# Patient Record
Sex: MALE | Race: WHITE | NOT HISPANIC OR LATINO | Employment: OTHER | URBAN - METROPOLITAN AREA
[De-identification: names, ages, dates, MRNs, and addresses within clinical notes are randomized per-mention and may not be internally consistent; named-entity substitution may affect disease eponyms.]

---

## 2017-05-09 DIAGNOSIS — K21.9 GASTROESOPHAGEAL REFLUX DISEASE, ESOPHAGITIS PRESENCE NOT SPECIFIED: ICD-10-CM

## 2017-05-09 DIAGNOSIS — E78.5 HYPERLIPIDEMIA, UNSPECIFIED HYPERLIPIDEMIA TYPE: ICD-10-CM

## 2017-05-10 RX ORDER — EZETIMIBE 10 MG/1
TABLET ORAL
Qty: 30 TABLET | Refills: 0 | Status: SHIPPED | OUTPATIENT
Start: 2017-05-10 | End: 2017-07-15 | Stop reason: SDUPTHER

## 2017-06-12 DIAGNOSIS — K21.9 GASTROESOPHAGEAL REFLUX DISEASE, ESOPHAGITIS PRESENCE NOT SPECIFIED: ICD-10-CM

## 2017-06-12 DIAGNOSIS — E78.5 HYPERLIPIDEMIA, UNSPECIFIED HYPERLIPIDEMIA TYPE: ICD-10-CM

## 2017-06-12 RX ORDER — EZETIMIBE 10 MG/1
TABLET ORAL
Qty: 30 TABLET | Refills: 0 | OUTPATIENT
Start: 2017-06-12

## 2017-06-14 ENCOUNTER — OFFICE VISIT (OUTPATIENT)
Dept: INTERNAL MEDICINE | Facility: CLINIC | Age: 42
End: 2017-06-14
Payer: COMMERCIAL

## 2017-06-14 VITALS
BODY MASS INDEX: 23.51 KG/M2 | HEART RATE: 90 BPM | HEIGHT: 70 IN | DIASTOLIC BLOOD PRESSURE: 68 MMHG | TEMPERATURE: 97 F | OXYGEN SATURATION: 99 % | SYSTOLIC BLOOD PRESSURE: 126 MMHG | WEIGHT: 164.25 LBS

## 2017-06-14 DIAGNOSIS — G47.00 INSOMNIA, UNSPECIFIED TYPE: Primary | ICD-10-CM

## 2017-06-14 DIAGNOSIS — M79.2 NEURALGIA: ICD-10-CM

## 2017-06-14 DIAGNOSIS — E78.2 MIXED HYPERLIPIDEMIA: ICD-10-CM

## 2017-06-14 PROCEDURE — 99213 OFFICE O/P EST LOW 20 MIN: CPT | Mod: S$GLB,,, | Performed by: PHYSICIAN ASSISTANT

## 2017-06-14 PROCEDURE — 99999 PR PBB SHADOW E&M-EST. PATIENT-LVL III: CPT | Mod: PBBFAC,,, | Performed by: PHYSICIAN ASSISTANT

## 2017-06-14 NOTE — PROGRESS NOTES
Subjective:       Patient ID: Jon Mora is a 41 y.o.W/ male.    Chief Complaint: Insomnia    HPI         He comes in today by himself and has the above need.  He wants to try and get something to take for insomnia.  He says he's had this problem all of his life.  He's been on a number of Leonardo's which he refuses to take again because they don't work.  Those include Ambien, Lunesta, Elavil, Restoril.  He's also requesting that his pain medicines be reinstated such as Norco and he also wants to have his anxiety medicine Xanax reinstated.  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  He says that he got these other medicines he's requesting from a physician down in [facility].  He's only seen his PCP Dr. Kevin Seo one time.        He says he's getting some life insurance and they're going to need to have a test of his glucose and also his lipid profile.  He wonders if he can get that test done today.  He says he hasn't eaten anything since around 7 AM.        I explained to him that he doesn't have many options on sleeping medicine since he is ruled out most of them.  I also do not feel comfortable refilling medicines but I don't think he needs since I've never seen this person before.    Review of Systems    Otherwise negative concerning the NEUROPSYCHIATRIC, MUSCULOSKELETAL, ORTHOPEDIC, CV, VASCULAR system review.    Objective:      Physical Exam    There was no physical exam today.    Assessment:       1. Insomnia, unspecified type    2. Neuralgia    3. Mixed hyperlipidemia        Plan:     1.  We will try to get him in to see Dr. Seo as soon as we can so that he may request refills for these controlled substances if Dr. Seo feels as necessary.  Otherwise he can go back to [place] and have his medicines refilled by the person the put him on them in the first place.  2.   Ordered FBS and FLP's today.

## 2017-06-19 ENCOUNTER — TELEPHONE (OUTPATIENT)
Dept: INTERNAL MEDICINE | Facility: CLINIC | Age: 42
End: 2017-06-19

## 2017-06-19 NOTE — TELEPHONE ENCOUNTER
----- Message from Tamar Stiles sent at 6/14/2017  4:41 PM CDT -----  Contact: pt [phone]  Pt called requesting apt with Dr Salas.  Last seen 2013. But pt states he touched base with him via e-mail. Pt states he is willing to drive from his home in [place] to see him because he is the one that diagnosed him with Neuralgia and he is having some trouble with this.  Pt can be reached at [phone]  Thanks  nisha

## 2017-06-19 NOTE — TELEPHONE ENCOUNTER
----- Message from Jon Salas MD sent at 6/19/2017  9:15 AM CDT -----  Contact: pt [phone]      ----- Message -----  From: Tamar Stiles  Sent: 6/14/2017   4:41 PM  To: [facility] Staff    Pt called requesting apt with Dr Salas.  Last seen 2013. But pt states he touched base with him via e-mail. Pt states he is willing to drive from his home in [place] to see him because he is the one that diagnosed him with Neuralgia and he is having some trouble with this.  Pt can be reached at [phone]  Thanks  nisha

## 2017-06-20 ENCOUNTER — OFFICE VISIT (OUTPATIENT)
Dept: INTERNAL MEDICINE | Facility: CLINIC | Age: 42
End: 2017-06-20
Attending: FAMILY MEDICINE
Payer: COMMERCIAL

## 2017-06-20 ENCOUNTER — LAB VISIT (OUTPATIENT)
Dept: LAB | Facility: HOSPITAL | Age: 42
End: 2017-06-20
Attending: FAMILY MEDICINE
Payer: COMMERCIAL

## 2017-06-20 VITALS
HEART RATE: 75 BPM | BODY MASS INDEX: 23.58 KG/M2 | WEIGHT: 164.69 LBS | OXYGEN SATURATION: 99 % | SYSTOLIC BLOOD PRESSURE: 110 MMHG | DIASTOLIC BLOOD PRESSURE: 76 MMHG | HEIGHT: 70 IN

## 2017-06-20 DIAGNOSIS — R10.9 ABDOMINAL PAIN, OTHER SPECIFIED SITE: ICD-10-CM

## 2017-06-20 DIAGNOSIS — G47.00 INSOMNIA, UNSPECIFIED TYPE: ICD-10-CM

## 2017-06-20 DIAGNOSIS — M79.2 NEURALGIA: Primary | ICD-10-CM

## 2017-06-20 DIAGNOSIS — Z00.00 ANNUAL PHYSICAL EXAM: ICD-10-CM

## 2017-06-20 DIAGNOSIS — K58.9 IRRITABLE BOWEL SYNDROME WITHOUT DIARRHEA: ICD-10-CM

## 2017-06-20 LAB
ALBUMIN SERPL BCP-MCNC: 4.4 G/DL
ALP SERPL-CCNC: 59 U/L
ALT SERPL W/O P-5'-P-CCNC: 19 U/L
ANION GAP SERPL CALC-SCNC: 7 MMOL/L
AST SERPL-CCNC: 21 U/L
BILIRUB SERPL-MCNC: 0.4 MG/DL
BUN SERPL-MCNC: 15 MG/DL
CALCIUM SERPL-MCNC: 9.6 MG/DL
CHLORIDE SERPL-SCNC: 105 MMOL/L
CHOLEST/HDLC SERPL: 6.6 {RATIO}
CO2 SERPL-SCNC: 29 MMOL/L
CREAT SERPL-MCNC: 0.9 MG/DL
ERYTHROCYTE [DISTWIDTH] IN BLOOD BY AUTOMATED COUNT: 13.1 %
EST. GFR  (AFRICAN AMERICAN): >60 ML/MIN/1.73 M^2
EST. GFR  (NON AFRICAN AMERICAN): >60 ML/MIN/1.73 M^2
GLUCOSE SERPL-MCNC: 105 MG/DL
HCT VFR BLD AUTO: 42 %
HDL/CHOLESTEROL RATIO: 15.1 %
HDLC SERPL-MCNC: 225 MG/DL
HDLC SERPL-MCNC: 34 MG/DL
HGB BLD-MCNC: 14.2 G/DL
LDLC SERPL CALC-MCNC: 160.2 MG/DL
MCH RBC QN AUTO: 29.8 PG
MCHC RBC AUTO-ENTMCNC: 33.8 %
MCV RBC AUTO: 88 FL
NONHDLC SERPL-MCNC: 191 MG/DL
PLATELET # BLD AUTO: 226 K/UL
PMV BLD AUTO: 11.7 FL
POTASSIUM SERPL-SCNC: 4 MMOL/L
PROT SERPL-MCNC: 7.8 G/DL
RBC # BLD AUTO: 4.76 M/UL
SODIUM SERPL-SCNC: 141 MMOL/L
TRIGL SERPL-MCNC: 154 MG/DL
WBC # BLD AUTO: 6.62 K/UL

## 2017-06-20 PROCEDURE — 99214 OFFICE O/P EST MOD 30 MIN: CPT | Mod: S$GLB,,, | Performed by: FAMILY MEDICINE

## 2017-06-20 PROCEDURE — 85027 COMPLETE CBC AUTOMATED: CPT

## 2017-06-20 PROCEDURE — 80061 LIPID PANEL: CPT

## 2017-06-20 PROCEDURE — 80053 COMPREHEN METABOLIC PANEL: CPT

## 2017-06-20 PROCEDURE — 99999 PR PBB SHADOW E&M-EST. PATIENT-LVL IV: CPT | Mod: PBBFAC,,, | Performed by: FAMILY MEDICINE

## 2017-06-20 PROCEDURE — 36415 COLL VENOUS BLD VENIPUNCTURE: CPT

## 2017-06-20 RX ORDER — TRAMADOL HYDROCHLORIDE 50 MG/1
50-100 TABLET ORAL
Qty: 40 TABLET | Refills: 0 | Status: SHIPPED | OUTPATIENT
Start: 2017-06-20

## 2017-06-20 RX ORDER — GABAPENTIN 100 MG/1
100 CAPSULE ORAL NIGHTLY
Qty: 30 CAPSULE | Refills: 11 | Status: SHIPPED | OUTPATIENT
Start: 2017-06-20 | End: 2017-08-10

## 2017-06-20 NOTE — PROGRESS NOTES
Subjective:       Patient ID: Jon Mora is a 41 y.o. male.    Chief Complaint: Follow-up    HPI  Review of Systems   Constitutional: Negative for chills, fatigue and fever.   HENT: Negative for congestion and trouble swallowing.    Eyes: Negative for redness.   Respiratory: Negative for cough, chest tightness and shortness of breath.    Cardiovascular: Negative for chest pain, palpitations and leg swelling.   Gastrointestinal: Positive for abdominal pain. Negative for blood in stool.   Genitourinary: Negative for hematuria.   Musculoskeletal: Positive for arthralgias and gait problem. Negative for back pain, joint swelling, myalgias and neck pain.   Skin: Negative for color change and rash.   Neurological: Negative for tremors, speech difficulty, weakness, numbness and headaches.   Hematological: Negative for adenopathy. Does not bruise/bleed easily.   Psychiatric/Behavioral: Positive for sleep disturbance. Negative for behavioral problems and confusion. The patient is not nervous/anxious.        Objective:      Physical Exam   Constitutional: He is oriented to person, place, and time. He appears well-developed and well-nourished. No distress.   Neck: Neck supple.   Pulmonary/Chest: Effort normal.   Musculoskeletal: He exhibits no edema.        Right lower leg: He exhibits no edema.        Left lower leg: He exhibits no edema.   Neurological: He is alert and oriented to person, place, and time.   Skin: Skin is warm and dry. No rash noted.   Psychiatric: He has a normal mood and affect. His behavior is normal. Judgment and thought content normal.   Nursing note and vitals reviewed.      Assessment:       1. Neuralgia    2. Abdominal pain, other specified site    3. Irritable bowel syndrome without diarrhea    4. Insomnia, unspecified type    5. Annual physical exam        Plan:   Jon was seen today for follow-up.    Diagnoses and all orders for this visit:    Neuralgia  -     Ambulatory referral  to Pain Clinic    Abdominal pain, other specified site    Irritable bowel syndrome without diarrhea    Insomnia, unspecified type  -     Ambulatory consult for Sleep Study    Annual physical exam  -     Comprehensive metabolic panel; Future  -     CBC Without Differential; Future  -     Lipid panel; Future    Other orders  -     tramadol (ULTRAM) 50 mg tablet; Take 1-2 tablets ( mg total) by mouth every 24 hours as needed for Pain.  -     gabapentin (NEURONTIN) 100 MG capsule; Take 1 capsule (100 mg total) by mouth every evening.      See meds, orders, follow up, routing and instructions sections of encounter.  HISTORY OF PRESENT ILLNESS:  This is a patient well known to me at our prior   clinic, who we took care of him for years before he moved to [place].  He   states that he saw a provider in [place] recently concerning sleep and pain   medications and they would not refill those medications for him.    The patient has a complex pain history that began when he had a right inguinal   hernia repair in 2004.  He has groin and lower abdominal pain, right greater   than left.  He also has a history of IBS and GERD.  He has seen multiple   consultants including Dr. Mae in Pain Management and had two shots, which were   ineffective.  He is also seeing Gastroenterology.  He has also seen Dr. Nunez in 2008 through 2009, who did cystoscopy and pelvic MRI.    He has had a lumbar MRI as well.    There has been no change to his symptom pattern.  In the past, we had given him   tramadol, which he would take 1-2 tablets on a given bad day, which he states   would occur every month or so, lasting usually 2-3 days.    The patient is also requesting sleep medications at this time.  He was   requesting a benzodiazepine.    RECOMMENDATIONS:  I am not inclined to prescribe benzodiazepine at this time.    The patient has had stated difficulties with trazodone and another tricyclic   antidepressants in the  past.  I would suggest a trial of low-dose Neurontin at   bedtime.  I am agreeable to refilling tramadol and wrote him a prescription for   #40, which he can take on an intermittent basis.    The patient requested evaluation of his foot.  He has high arches suggested an   arch support.    The patient requested annual laboratory to be drawn today.    The patient does not have a history of substance abuse to our knowledge and I   think a reasonable low-dose of tramadol is acceptable for refills and I would   also suggest referral to Pain Management for a new opinion and to the Sleep   Center for any further ideas concerning chronic insomnia.      MARIA GUADALUPE/IN  dd: 06/20/2017 10:11:37 (CDT)  td: 06/21/2017 05:43:50 (CDT)  Doc ID   #[id number]  Job ID #[id number]    CC:

## 2017-07-15 DIAGNOSIS — E78.5 HYPERLIPIDEMIA, UNSPECIFIED HYPERLIPIDEMIA TYPE: ICD-10-CM

## 2017-07-18 RX ORDER — EZETIMIBE 10 MG/1
TABLET ORAL
Qty: 30 TABLET | Refills: 0 | Status: SHIPPED | OUTPATIENT
Start: 2017-07-18 | End: 2017-08-10 | Stop reason: SDUPTHER

## 2017-08-10 ENCOUNTER — OFFICE VISIT (OUTPATIENT)
Dept: INTERNAL MEDICINE | Facility: CLINIC | Age: 42
End: 2017-08-10
Payer: COMMERCIAL

## 2017-08-10 VITALS
HEART RATE: 78 BPM | BODY MASS INDEX: 24.18 KG/M2 | HEIGHT: 70 IN | OXYGEN SATURATION: 99 % | DIASTOLIC BLOOD PRESSURE: 80 MMHG | WEIGHT: 168.88 LBS | TEMPERATURE: 97 F | SYSTOLIC BLOOD PRESSURE: 106 MMHG

## 2017-08-10 DIAGNOSIS — Z00.00 ROUTINE HEALTH MAINTENANCE: Primary | ICD-10-CM

## 2017-08-10 DIAGNOSIS — E04.1 THYROID NODULE: ICD-10-CM

## 2017-08-10 DIAGNOSIS — E78.5 HYPERLIPIDEMIA, UNSPECIFIED HYPERLIPIDEMIA TYPE: ICD-10-CM

## 2017-08-10 PROCEDURE — 90471 IMMUNIZATION ADMIN: CPT | Mod: S$GLB,,, | Performed by: FAMILY MEDICINE

## 2017-08-10 PROCEDURE — 99999 PR PBB SHADOW E&M-EST. PATIENT-LVL III: CPT | Mod: PBBFAC,,, | Performed by: FAMILY MEDICINE

## 2017-08-10 PROCEDURE — 99396 PREV VISIT EST AGE 40-64: CPT | Mod: S$GLB,,, | Performed by: FAMILY MEDICINE

## 2017-08-10 PROCEDURE — 90715 TDAP VACCINE 7 YRS/> IM: CPT | Mod: S$GLB,,, | Performed by: FAMILY MEDICINE

## 2017-08-10 RX ORDER — EZETIMIBE 10 MG/1
10 TABLET ORAL DAILY
Qty: 90 TABLET | Refills: 3 | Status: SHIPPED | OUTPATIENT
Start: 2017-08-10

## 2017-08-20 NOTE — PROGRESS NOTES
Subjective:       Patient ID: Jon Mora is a 42 y.o. male.    Chief Complaint: here for physical examination and issues  below    HPImood doing well off prozac. Gained wt w it  Thy nod due david  Chol on zetia  gerd controlled med  Past Medical History:   Diagnosis Date    Anxiety     GERD (gastroesophageal reflux disease)     Thyroid nodule     norma fna 09     Past Surgical History:   Procedure Laterality Date    ablation of iguinal nerve      FINGER SURGERY      HERNIA REPAIR       Family History   Problem Relation Age of Onset    Celiac disease Neg Hx     Cirrhosis Neg Hx     Colon cancer Neg Hx     Colon polyps Neg Hx     Crohn's disease Neg Hx     Cystic fibrosis Neg Hx     Esophageal cancer Neg Hx     Hemochromatosis Neg Hx     Inflammatory bowel disease Neg Hx     Irritable bowel syndrome Neg Hx     Liver cancer Neg Hx     Liver disease Neg Hx     Rectal cancer Neg Hx     Stomach cancer Neg Hx     Ulcerative colitis Neg Hx     John's disease Neg Hx      Social History     Social History    Marital status: Single     Spouse name: N/A    Number of children: N/A    Years of education: N/A     Social History Main Topics    Smoking status: Never Smoker    Smokeless tobacco: Never Used    Alcohol use No    Drug use: No    Sexual activity: Not Asked     Other Topics Concern    None     Social History Narrative     [employer]       Review of Systems  Cardiovascular: no chest pain  Chest: no shortness of breath  Abd: no abd pain  Remainder review of systems negative    Objective:      Physical Exam   Constitutional: He is oriented to person, place, and time. He appears well-developed and well-nourished.   HENT:   Head: Normocephalic and atraumatic.   Right Ear: External ear normal.   Left Ear: External ear normal.   Nose: Nose normal.   Mouth/Throat: Oropharynx is clear and moist.   Nl tms   Eyes: Conjunctivae and EOM are normal. Pupils are equal, round, and  reactive to light. No scleral icterus.   Neck: Normal range of motion. Neck supple. Carotid bruit is not present.   Cardiovascular: Normal rate, regular rhythm and normal heart sounds.  Exam reveals no gallop and no friction rub.    No murmur heard.  Pulmonary/Chest: Effort normal and breath sounds normal. He has no wheezes.   Abdominal: Soft. Bowel sounds are normal. He exhibits no distension and no mass. There is no hepatosplenomegaly. There is no tenderness. There is no rebound and no guarding.   Musculoskeletal: Normal range of motion. He exhibits no tenderness.   Lymphadenopathy:     He has no cervical adenopathy.   Neurological: He is alert and oriented to person, place, and time. No cranial nerve deficit. Coordination normal.   Skin: Skin is warm and dry. No rash noted. No erythema.   Psychiatric: He has a normal mood and affect. His behavior is normal. Judgment and thought content normal.   Nursing note and vitals reviewed.      Assessment:       1. Routine health maintenance    2. Thyroid nodule    3. Hyperlipidemia, unspecified hyperlipidemia type        Plan:       **Routine health maintenance  -     Comprehensive metabolic panel; Future; Expected date: 08/10/2018  -     Lipid panel; Future; Expected date: 08/10/2018  -     TSH; Future; Expected date: 08/10/2018    Thyroid nodule  -     US Soft Tissue Head Neck Thyroid; Future; Expected date: 08/10/2017    Hyperlipidemia, unspecified hyperlipidemia type    Other orders  -     (In Office Administered) Tdap Vaccine  -     ranitidine (ZANTAC) 300 MG tablet; Take 1 tablet (300 mg total) by mouth every evening.  Dispense: 90 tablet; Refill: 3  -     ezetimibe (ZETIA) 10 mg tablet; Take 1 tablet (10 mg total) by mouth once daily.  Dispense: 90 tablet; Refill: 3    *  Return in about 1 year (around 8/10/2018).

## 2017-11-20 ENCOUNTER — OFFICE VISIT (OUTPATIENT)
Dept: INTERNAL MEDICINE | Facility: CLINIC | Age: 42
End: 2017-11-20
Payer: COMMERCIAL

## 2017-11-20 VITALS
SYSTOLIC BLOOD PRESSURE: 122 MMHG | TEMPERATURE: 97 F | OXYGEN SATURATION: 99 % | HEART RATE: 71 BPM | BODY MASS INDEX: 24.18 KG/M2 | WEIGHT: 168.88 LBS | DIASTOLIC BLOOD PRESSURE: 82 MMHG | HEIGHT: 70 IN

## 2017-11-20 DIAGNOSIS — K58.1 IRRITABLE BOWEL SYNDROME WITH CONSTIPATION: Primary | ICD-10-CM

## 2017-11-20 DIAGNOSIS — Z00.00 PHYSICAL EXAM: ICD-10-CM

## 2017-11-20 PROCEDURE — 99213 OFFICE O/P EST LOW 20 MIN: CPT | Mod: 25,S$GLB,, | Performed by: PHYSICIAN ASSISTANT

## 2017-11-20 PROCEDURE — 99999 PR PBB SHADOW E&M-EST. PATIENT-LVL III: CPT | Mod: PBBFAC,,, | Performed by: PHYSICIAN ASSISTANT

## 2017-11-20 PROCEDURE — 90471 IMMUNIZATION ADMIN: CPT | Mod: S$GLB,,, | Performed by: FAMILY MEDICINE

## 2017-11-20 PROCEDURE — 90686 IIV4 VACC NO PRSV 0.5 ML IM: CPT | Mod: S$GLB,,, | Performed by: FAMILY MEDICINE

## 2017-11-20 RX ORDER — DULOXETIN HYDROCHLORIDE 30 MG/1
30 CAPSULE, DELAYED RELEASE ORAL DAILY
Qty: 30 CAPSULE | Refills: 0 | Status: SHIPPED | OUTPATIENT
Start: 2017-11-20 | End: 2017-12-12 | Stop reason: SDUPTHER

## 2017-11-20 NOTE — PROGRESS NOTES
Subjective:      Patient ID: Jon Mora is a 42 y.o. male.    Chief Complaint: Follow-up    HPI  Here today for some paper work to be filled out for his insurance company. Requesting lipids, blood glucose as well.   Additionally is requesting to be started on cymbalta for IBS-C. Has seen gastro for this in the past. Has failed treatment on nortriptyline and prozac. Discontinued prozac due to SE (weight gain). Discontinued nortryptiline due to drowsiness while on the medicaiton. SE>>benefits of the medication. Requesting to be started on a different medication. PCP is Dr. Seo. Seen this year already.     Review of Systems   Constitutional: Negative for activity change, appetite change, chills, diaphoresis, fatigue, fever and unexpected weight change.   HENT: Negative.  Negative for congestion, hearing loss, postnasal drip, rhinorrhea, sore throat, trouble swallowing and voice change.    Eyes: Negative.  Negative for visual disturbance.   Respiratory: Negative.  Negative for cough, choking, chest tightness and shortness of breath.    Cardiovascular: Negative for chest pain, palpitations and leg swelling.   Gastrointestinal: Positive for constipation. Negative for abdominal distention, abdominal pain, blood in stool, diarrhea, nausea and vomiting.   Endocrine: Negative for cold intolerance, heat intolerance, polydipsia and polyuria.   Genitourinary: Negative.  Negative for difficulty urinating and frequency.   Musculoskeletal: Negative for arthralgias, back pain, gait problem, joint swelling and myalgias.   Skin: Negative for color change, pallor, rash and wound.   Neurological: Negative for dizziness, tremors, weakness, light-headedness, numbness and headaches.   Hematological: Negative for adenopathy.   Psychiatric/Behavioral: Negative for agitation, behavioral problems, confusion, decreased concentration, dysphoric mood, hallucinations, self-injury, sleep disturbance and suicidal ideas. The  "patient is not nervous/anxious and is not hyperactive.      Objective:   /82   Pulse 71   Temp 97.1 °F (36.2 °C) (Tympanic)   Ht 5' 10" (1.778 m)   Wt 76.6 kg (168 lb 14 oz)   SpO2 99%   BMI 24.23 kg/m²     Physical Exam   Constitutional: He is oriented to person, place, and time. He appears well-developed and well-nourished.   HENT:   Head: Normocephalic and atraumatic.   Right Ear: External ear normal.   Left Ear: External ear normal.   Nose: Nose normal.   Mouth/Throat: Oropharynx is clear and moist.   Eyes: Conjunctivae and EOM are normal. Pupils are equal, round, and reactive to light.   Neck: Normal range of motion. Neck supple.   Cardiovascular: Normal rate, regular rhythm and normal heart sounds.  Exam reveals no gallop and no friction rub.    No murmur heard.  Pulmonary/Chest: Effort normal and breath sounds normal. No respiratory distress. He has no wheezes. He has no rales. He exhibits no tenderness.   Abdominal: Soft. He exhibits no distension. There is no tenderness.   Musculoskeletal: Normal range of motion.   Lymphadenopathy:     He has no cervical adenopathy.   Neurological: He is alert and oriented to person, place, and time.   Skin: Skin is warm and dry.   Psychiatric: He has a normal mood and affect. His behavior is normal. Judgment and thought content normal.   Vitals reviewed.      Assessment:     1. Irritable bowel syndrome with constipation    2. Physical exam      Plan:   Irritable bowel syndrome with constipation  -     DULoxetine (CYMBALTA) 30 MG capsule; Take 1 capsule (30 mg total) by mouth once daily.  Dispense: 30 capsule; Refill: 0  -will try cymbalta. Follow up with PCP in 4 weeks to discuss further. Advised patient that he must take this daily.     Physical exam  -no acute findings on physical exam. Appropriate paperwork was filled out today.     Other orders  -     Influenza - Quadrivalent (3 years & older) (PF)    Return if symptoms worsen or fail to improve.      "

## 2017-12-12 ENCOUNTER — OFFICE VISIT (OUTPATIENT)
Dept: INTERNAL MEDICINE | Facility: CLINIC | Age: 42
End: 2017-12-12
Payer: COMMERCIAL

## 2017-12-12 VITALS
WEIGHT: 166.13 LBS | OXYGEN SATURATION: 98 % | SYSTOLIC BLOOD PRESSURE: 132 MMHG | HEIGHT: 70 IN | DIASTOLIC BLOOD PRESSURE: 76 MMHG | TEMPERATURE: 98 F | HEART RATE: 98 BPM | BODY MASS INDEX: 23.78 KG/M2

## 2017-12-12 DIAGNOSIS — K58.1 IRRITABLE BOWEL SYNDROME WITH CONSTIPATION: ICD-10-CM

## 2017-12-12 DIAGNOSIS — Z09 FOLLOW UP: Primary | ICD-10-CM

## 2017-12-12 PROCEDURE — 99999 PR PBB SHADOW E&M-EST. PATIENT-LVL III: CPT | Mod: PBBFAC,,, | Performed by: NURSE PRACTITIONER

## 2017-12-12 PROCEDURE — 99214 OFFICE O/P EST MOD 30 MIN: CPT | Mod: S$GLB,,, | Performed by: NURSE PRACTITIONER

## 2017-12-12 RX ORDER — DULOXETIN HYDROCHLORIDE 30 MG/1
30 CAPSULE, DELAYED RELEASE ORAL DAILY
Qty: 90 CAPSULE | Refills: 1 | Status: SHIPPED | OUTPATIENT
Start: 2017-12-12 | End: 2018-06-14 | Stop reason: SDUPTHER

## 2017-12-12 NOTE — PATIENT INSTRUCTIONS
Irritable Bowel Syndrome    Irritable bowel syndrome (IBS) is a disorder of the intestines. It is not a disease, but a group of symptoms caused by changes in the way the intestines work. It is fairly common, but the cause is not well understood.  Symptoms of IBS include:  · Abdominal pain, discomfort, and cramping  · Diarrhea  · Constipation or dry, hard stools  · Mucous stool  · Bloating  · Feeling of incomplete bowel movements  It usually results in one of 3 patterns of symptoms:  · Chronic abdominal pain and constipation  · Recurring episodes of diarrhea, with or without pain  · Alternating diarrhea and constipation  Home care  The goal of treatment is to control and relieve your symptoms, so you can lead a full and active life. There is no cure for IBS. But it can be managed.  Diet  Your diet did not cause your IBS, but it can affect it. No one diet works for everyone. Finding the best foods for you may take trial and error. Keep a food log to help find what foods made your symptoms worse. Below are some tips that may help you.  · Eat more slowly. Eat smaller amounts at a time, but more often. Remember, you can always eat more, but cannot eat less once youve eaten too much.  · High-fiber foods are complicated. While they may help relieve constipation, they can make your bloating, cramping, gas, and diarrhea worse.  · Eat less sugar.  · Try cutting out dairy products. Sometimes this helps.  · Try cutting out foods that are high in fat and fatty meats.  · You can control bloating or passing excess gas. Be careful with gassy vegetables and fruits like beans, cabbage, broccoli, and cauliflower.  · Be careful with carbonated beverages and fruit juices. They can make your bloating and diarrhea worse.  · Caffeine, alcohol, and stimulants can make symptoms worse. These include coffee, tea, sodas, energy drinks, and chocolate.  Lifestyle  · Look for factors that seem to worsen your symptoms. These include stress and  emotions.   · Although stress does not cause IBS, it may trigger flare-ups. Counseling can help you learn to handle stress. So can self-help measures like exercise, yoga, and meditation.  · Depression can occur along with IBS. Your healthcare provider may prescribe antidepressant medicine. This may help with diarrhea, constipation, and cramping, as well as with symptoms of depression.  · Smoking doesn't cause IBS, but can make the symptoms worse.  Medicines  Your healthcare provider may prescribe medicines. Take them as directed. For acute flare-ups of your illness, your provider may give you prescription medicines.  · Check with your healthcare provider before taking any medicines for diarrhea.  · Avoid anti-inflammatory medicines like ibuprofen or naproxen.  · Consider nutritional supplements. This is especially true if your diarrhea is prolonged, or you aren't eating or are losing weight  Follow-up care  Follow up with your healthcare provider, or as advised. If a stool sample was taken or cultures were done, you will be told if your treatment needs to change. You can call as directed for the results.  When to seek medical advice  Call your healthcare provider right away if any of these occur:  · Abdominal pain gets worse  · Constant abdominal pain moves to the right-lower abdomen  · You can't keep liquids down because of vomiting  · You have severe diarrhea  · You have blood (red or black color) or mucus in your stool  · You feel very weak or dizzy, faint, or have extreme thirst  · You have a fever of 100.4ºF (38.0ºC) or higher, or as directed by your healthcare provider  Date Last Reviewed: 8/31/2015  © 3138-2033 [employer]. [address] [place] [address], [place] [address]. All rights reserved. This information is not intended as a substitute for professional medical care. Always follow your healthcare professional's instructions.

## 2017-12-12 NOTE — PROGRESS NOTES
Subjective:       Patient ID: Jon Mora is a 42 y.o. male.    Chief Complaint: Follow-up    Patient presents for a follow up after starting Cymbalta for IBS.  Reports he's tolerating medication well.  No negative side effects.  Symptoms are controlled.  Requesting refill.       Review of Systems   Constitutional: Negative for chills and fatigue.   Respiratory: Negative for cough and shortness of breath.    Gastrointestinal: Positive for constipation.        History of IBS with constipation    Musculoskeletal: Negative for back pain and gait problem.   Neurological: Negative for headaches.   Psychiatric/Behavioral: Negative for agitation, confusion, dysphoric mood, self-injury and suicidal ideas.       Objective:      Physical Exam   Constitutional: He is oriented to person, place, and time. Vital signs are normal. He appears well-developed and well-nourished.   HENT:   Head: Normocephalic and atraumatic.   Neck: Normal range of motion.   Cardiovascular: Normal rate and regular rhythm.    Pulmonary/Chest: Effort normal and breath sounds normal.   Musculoskeletal: Normal range of motion.   Neurological: He is alert and oriented to person, place, and time.   Skin: Skin is warm.   Psychiatric: He has a normal mood and affect. His behavior is normal.       Assessment:       1. Follow up    2. Irritable bowel syndrome with constipation        Plan:         Follow up  -     DULoxetine (CYMBALTA) 30 MG capsule; Take 1 capsule (30 mg total) by mouth once daily.  Dispense: 90 capsule; Refill: 1    Irritable bowel syndrome with constipation  -     DULoxetine (CYMBALTA) 30 MG capsule; Take 1 capsule (30 mg total) by mouth once daily.  Dispense: 90 capsule; Refill: 1        Refilled medications.  Instructed to follow up if needed.

## 2018-06-14 DIAGNOSIS — Z09 FOLLOW UP: ICD-10-CM

## 2018-06-14 DIAGNOSIS — K58.1 IRRITABLE BOWEL SYNDROME WITH CONSTIPATION: ICD-10-CM

## 2018-06-14 RX ORDER — DULOXETIN HYDROCHLORIDE 30 MG/1
30 CAPSULE, DELAYED RELEASE ORAL DAILY
Qty: 90 CAPSULE | Refills: 1 | Status: SHIPPED | OUTPATIENT
Start: 2018-06-14 | End: 2018-11-30 | Stop reason: SDUPTHER

## 2018-11-30 ENCOUNTER — OFFICE VISIT (OUTPATIENT)
Dept: INTERNAL MEDICINE | Facility: CLINIC | Age: 43
End: 2018-11-30
Payer: COMMERCIAL

## 2018-11-30 ENCOUNTER — LAB VISIT (OUTPATIENT)
Dept: LAB | Facility: HOSPITAL | Age: 43
End: 2018-11-30
Attending: NURSE PRACTITIONER
Payer: COMMERCIAL

## 2018-11-30 VITALS
SYSTOLIC BLOOD PRESSURE: 136 MMHG | OXYGEN SATURATION: 99 % | WEIGHT: 183.31 LBS | HEART RATE: 89 BPM | TEMPERATURE: 98 F | DIASTOLIC BLOOD PRESSURE: 74 MMHG | BODY MASS INDEX: 26.24 KG/M2 | HEIGHT: 70 IN

## 2018-11-30 DIAGNOSIS — E78.5 HYPERLIPIDEMIA, UNSPECIFIED HYPERLIPIDEMIA TYPE: ICD-10-CM

## 2018-11-30 DIAGNOSIS — Z00.00 ROUTINE MEDICAL EXAM: Primary | ICD-10-CM

## 2018-11-30 DIAGNOSIS — K21.9 GASTROESOPHAGEAL REFLUX DISEASE, ESOPHAGITIS PRESENCE NOT SPECIFIED: ICD-10-CM

## 2018-11-30 LAB
ALBUMIN SERPL BCP-MCNC: 4.2 G/DL
ALP SERPL-CCNC: 55 U/L
ALT SERPL W/O P-5'-P-CCNC: 39 U/L
ANION GAP SERPL CALC-SCNC: 8 MMOL/L
AST SERPL-CCNC: 29 U/L
BILIRUB SERPL-MCNC: 0.4 MG/DL
BUN SERPL-MCNC: 16 MG/DL
CALCIUM SERPL-MCNC: 9.9 MG/DL
CHLORIDE SERPL-SCNC: 105 MMOL/L
CHOLEST SERPL-MCNC: 291 MG/DL
CHOLEST/HDLC SERPL: 7.3 {RATIO}
CO2 SERPL-SCNC: 28 MMOL/L
CREAT SERPL-MCNC: 0.9 MG/DL
EST. GFR  (AFRICAN AMERICAN): >60 ML/MIN/1.73 M^2
EST. GFR  (NON AFRICAN AMERICAN): >60 ML/MIN/1.73 M^2
GLUCOSE SERPL-MCNC: 102 MG/DL
HDLC SERPL-MCNC: 40 MG/DL
HDLC SERPL: 13.7 %
LDLC SERPL CALC-MCNC: ABNORMAL MG/DL
NONHDLC SERPL-MCNC: 251 MG/DL
POTASSIUM SERPL-SCNC: 4 MMOL/L
PROT SERPL-MCNC: 7.5 G/DL
SODIUM SERPL-SCNC: 141 MMOL/L
TRIGL SERPL-MCNC: 407 MG/DL

## 2018-11-30 PROCEDURE — 80053 COMPREHEN METABOLIC PANEL: CPT | Mod: PO

## 2018-11-30 PROCEDURE — 36415 COLL VENOUS BLD VENIPUNCTURE: CPT | Mod: PO

## 2018-11-30 PROCEDURE — 99214 OFFICE O/P EST MOD 30 MIN: CPT | Mod: S$GLB,,, | Performed by: NURSE PRACTITIONER

## 2018-11-30 PROCEDURE — 80061 LIPID PANEL: CPT | Mod: PO

## 2018-11-30 PROCEDURE — 99999 PR PBB SHADOW E&M-EST. PATIENT-LVL III: CPT | Mod: PBBFAC,,, | Performed by: NURSE PRACTITIONER

## 2018-11-30 PROCEDURE — 3008F BODY MASS INDEX DOCD: CPT | Mod: CPTII,S$GLB,, | Performed by: NURSE PRACTITIONER

## 2018-11-30 RX ORDER — DULOXETIN HYDROCHLORIDE 30 MG/1
30 CAPSULE, DELAYED RELEASE ORAL DAILY
Qty: 90 CAPSULE | Refills: 1 | Status: SHIPPED | OUTPATIENT
Start: 2018-11-30 | End: 2019-08-16 | Stop reason: SDUPTHER

## 2019-08-16 ENCOUNTER — OFFICE VISIT (OUTPATIENT)
Dept: INTERNAL MEDICINE | Facility: CLINIC | Age: 44
End: 2019-08-16
Payer: COMMERCIAL

## 2019-08-16 VITALS
TEMPERATURE: 99 F | BODY MASS INDEX: 26.55 KG/M2 | HEART RATE: 99 BPM | HEIGHT: 70 IN | OXYGEN SATURATION: 97 % | SYSTOLIC BLOOD PRESSURE: 134 MMHG | WEIGHT: 185.44 LBS | DIASTOLIC BLOOD PRESSURE: 76 MMHG

## 2019-08-16 DIAGNOSIS — K58.1 IRRITABLE BOWEL SYNDROME WITH CONSTIPATION: Primary | ICD-10-CM

## 2019-08-16 PROCEDURE — 3008F PR BODY MASS INDEX (BMI) DOCUMENTED: ICD-10-PCS | Mod: CPTII,S$GLB,, | Performed by: NURSE PRACTITIONER

## 2019-08-16 PROCEDURE — 3008F BODY MASS INDEX DOCD: CPT | Mod: CPTII,S$GLB,, | Performed by: NURSE PRACTITIONER

## 2019-08-16 PROCEDURE — 99999 PR PBB SHADOW E&M-EST. PATIENT-LVL III: ICD-10-PCS | Mod: PBBFAC,,, | Performed by: NURSE PRACTITIONER

## 2019-08-16 PROCEDURE — 99999 PR PBB SHADOW E&M-EST. PATIENT-LVL III: CPT | Mod: PBBFAC,,, | Performed by: NURSE PRACTITIONER

## 2019-08-16 PROCEDURE — 99214 PR OFFICE/OUTPT VISIT, EST, LEVL IV, 30-39 MIN: ICD-10-PCS | Mod: S$GLB,,, | Performed by: NURSE PRACTITIONER

## 2019-08-16 PROCEDURE — 99214 OFFICE O/P EST MOD 30 MIN: CPT | Mod: S$GLB,,, | Performed by: NURSE PRACTITIONER

## 2019-08-16 RX ORDER — DULOXETIN HYDROCHLORIDE 30 MG/1
60 CAPSULE, DELAYED RELEASE ORAL DAILY
Qty: 180 CAPSULE | Refills: 1 | Status: SHIPPED | OUTPATIENT
Start: 2019-08-16

## 2019-08-16 NOTE — PROGRESS NOTES
Subjective:       Patient ID: Jon Mora is a 44 y.o. male.    Chief Complaint: Medication Dose Change    Patient present requesting increase in Cymbalta.  Taking to reduce symptoms for IBS.  Requesting increase.      Review of Systems   Constitutional: Negative for chills and fatigue.   Respiratory: Negative for cough and shortness of breath.    Gastrointestinal: Negative for abdominal pain and nausea.   Musculoskeletal: Negative for arthralgias and gait problem.   Skin: Negative for color change and rash.       Objective:      Physical Exam   Constitutional: He is oriented to person, place, and time. Vital signs are normal. He appears well-developed and well-nourished.   HENT:   Head: Normocephalic and atraumatic.   Neck: Normal range of motion.   Cardiovascular: Normal rate and regular rhythm.   Pulmonary/Chest: Effort normal and breath sounds normal.   Musculoskeletal: Normal range of motion.   Neurological: He is alert and oriented to person, place, and time.   Skin: Skin is warm.   Psychiatric: He has a normal mood and affect. His behavior is normal.       Assessment:       1. Irritable bowel syndrome with constipation        Plan:           Irritable bowel syndrome with constipation    Other orders  -     DULoxetine (CYMBALTA) 30 MG capsule; Take 2 capsules (60 mg total) by mouth once daily.  Dispense: 180 capsule; Refill: 1        Increased dose.  Has follow up with Dr. Seo next week.

## 2024-05-29 NOTE — PROGRESS NOTES
Subjective:       Patient ID: Jon Mora is a 43 y.o. male.    Chief Complaint: Complete forms    Patient presents for routine exam and to have [employer] employee form filled out.  Due for annual labs.  Currently not fasting but rather have labs done today due to urgency of the form for employer.  Headed out of town.  Denies any other concerns.  Currently taking Cymbalta and Zetia.  Reports GERD-stable.        Review of Systems   Constitutional: Negative for chills and fatigue.   Respiratory: Negative for cough and shortness of breath.    Cardiovascular: Negative for chest pain.   Musculoskeletal: Negative for arthralgias and gait problem.   Skin: Negative for color change and rash.   Psychiatric/Behavioral: Negative for agitation and confusion.       Objective:      Physical Exam   Constitutional: He is oriented to person, place, and time. Vital signs are normal. He appears well-developed and well-nourished.   HENT:   Head: Normocephalic and atraumatic.   Neck: Normal range of motion.   Cardiovascular: Normal rate and regular rhythm.   Pulmonary/Chest: Effort normal and breath sounds normal.   Musculoskeletal: Normal range of motion.   Neurological: He is alert and oriented to person, place, and time.   Skin: Skin is warm.   Psychiatric: He has a normal mood and affect. His behavior is normal.       Assessment:       1. Routine medical exam    2. Hyperlipidemia, unspecified hyperlipidemia type    3. Gastroesophageal reflux disease, esophagitis presence not specified        Plan:       Routine medical exam  -     DULoxetine (CYMBALTA) 30 MG capsule; Take 1 capsule (30 mg total) by mouth once daily.  Dispense: 90 capsule; Refill: 1    Hyperlipidemia, unspecified hyperlipidemia type  -     Lipid panel; Future; Expected date: 11/30/2018  -     Comprehensive metabolic panel; Future; Expected date: 11/30/2018    Gastroesophageal reflux disease, esophagitis presence not specified        Labs today.   Will complete [employer] form when lab reports are in.  Follow up in 6 months with Dr. Seo.        Patent